# Patient Record
Sex: FEMALE | Race: WHITE | NOT HISPANIC OR LATINO | URBAN - METROPOLITAN AREA
[De-identification: names, ages, dates, MRNs, and addresses within clinical notes are randomized per-mention and may not be internally consistent; named-entity substitution may affect disease eponyms.]

---

## 2019-07-18 ENCOUNTER — HOSPITAL ENCOUNTER (EMERGENCY)
Facility: HOSPITAL | Age: 19
Discharge: HOME | End: 2019-07-19
Attending: EMERGENCY MEDICINE

## 2019-07-18 VITALS
WEIGHT: 230 LBS | TEMPERATURE: 98 F | HEART RATE: 98 BPM | OXYGEN SATURATION: 98 % | RESPIRATION RATE: 11 BRPM | SYSTOLIC BLOOD PRESSURE: 130 MMHG | DIASTOLIC BLOOD PRESSURE: 71 MMHG

## 2019-07-18 DIAGNOSIS — N10 ACUTE PYELONEPHRITIS: Primary | ICD-10-CM

## 2019-07-18 LAB
ANION GAP SERPL CALC-SCNC: 9 MMOL/L (ref 8–16)
B-HCG UR QL: NEGATIVE
BACTERIA #/AREA URNS HPF: ABNORMAL /HPF
BASOPHILS # BLD AUTO: 0.04 K/UL (ref 0–0.2)
BASOPHILS NFR BLD: 0.5 % (ref 0–1.9)
BILIRUB UR QL STRIP: NEGATIVE
BUN SERPL-MCNC: 9 MG/DL (ref 6–20)
CALCIUM SERPL-MCNC: 8.6 MG/DL (ref 8.7–10.5)
CHLORIDE SERPL-SCNC: 104 MMOL/L (ref 95–110)
CLARITY UR: ABNORMAL
CO2 SERPL-SCNC: 23 MMOL/L (ref 23–29)
COLOR UR: ABNORMAL
CREAT SERPL-MCNC: 1.1 MG/DL (ref 0.5–1.4)
DIFFERENTIAL METHOD: NORMAL
EOSINOPHIL # BLD AUTO: 0.1 K/UL (ref 0–0.5)
EOSINOPHIL NFR BLD: 0.7 % (ref 0–8)
ERYTHROCYTE [DISTWIDTH] IN BLOOD BY AUTOMATED COUNT: 12.5 % (ref 11.5–14.5)
EST. GFR  (AFRICAN AMERICAN): >60 ML/MIN/1.73 M^2
EST. GFR  (NON AFRICAN AMERICAN): >60 ML/MIN/1.73 M^2
GLUCOSE SERPL-MCNC: 96 MG/DL (ref 70–110)
GLUCOSE UR QL STRIP: ABNORMAL
HCT VFR BLD AUTO: 38.4 % (ref 37–48.5)
HGB BLD-MCNC: 13.1 G/DL (ref 12–16)
HGB UR QL STRIP: ABNORMAL
HYALINE CASTS #/AREA URNS LPF: 0 /LPF
IMM GRANULOCYTES # BLD AUTO: 0.02 K/UL (ref 0–0.04)
IMM GRANULOCYTES NFR BLD AUTO: 0.2 % (ref 0–0.5)
KETONES UR QL STRIP: ABNORMAL
LEUKOCYTE ESTERASE UR QL STRIP: ABNORMAL
LYMPHOCYTES # BLD AUTO: 1.6 K/UL (ref 1–4.8)
LYMPHOCYTES NFR BLD: 19.8 % (ref 18–48)
MCH RBC QN AUTO: 29.8 PG (ref 27–31)
MCHC RBC AUTO-ENTMCNC: 34.1 G/DL (ref 32–36)
MCV RBC AUTO: 87 FL (ref 82–98)
MICROSCOPIC COMMENT: ABNORMAL
MONOCYTES # BLD AUTO: 0.7 K/UL (ref 0.3–1)
MONOCYTES NFR BLD: 8.8 % (ref 4–15)
NEUTROPHILS # BLD AUTO: 5.6 K/UL (ref 1.8–7.7)
NEUTROPHILS NFR BLD: 70 % (ref 38–73)
NITRITE UR QL STRIP: POSITIVE
NRBC BLD-RTO: 0 /100 WBC
PH UR STRIP: 6 [PH] (ref 5–8)
PLATELET # BLD AUTO: 271 K/UL (ref 150–350)
PMV BLD AUTO: 11 FL (ref 9.2–12.9)
POTASSIUM SERPL-SCNC: 4 MMOL/L (ref 3.5–5.1)
PROT UR QL STRIP: ABNORMAL
RBC # BLD AUTO: 4.4 M/UL (ref 4–5.4)
RBC #/AREA URNS HPF: 5 /HPF (ref 0–4)
SODIUM SERPL-SCNC: 136 MMOL/L (ref 136–145)
SP GR UR STRIP: 1.01 (ref 1–1.03)
SQUAMOUS #/AREA URNS HPF: 15 /HPF
URN SPEC COLLECT METH UR: ABNORMAL
UROBILINOGEN UR STRIP-ACNC: 1 EU/DL
WBC # BLD AUTO: 8.03 K/UL (ref 3.9–12.7)
WBC #/AREA URNS HPF: 20 /HPF (ref 0–5)

## 2019-07-18 PROCEDURE — 85025 COMPLETE CBC W/AUTO DIFF WBC: CPT

## 2019-07-18 PROCEDURE — 87086 URINE CULTURE/COLONY COUNT: CPT

## 2019-07-18 PROCEDURE — 81025 URINE PREGNANCY TEST: CPT

## 2019-07-18 PROCEDURE — 99284 EMERGENCY DEPT VISIT MOD MDM: CPT | Mod: 25

## 2019-07-18 PROCEDURE — 96374 THER/PROPH/DIAG INJ IV PUSH: CPT

## 2019-07-18 PROCEDURE — 63600175 PHARM REV CODE 636 W HCPCS: Performed by: EMERGENCY MEDICINE

## 2019-07-18 PROCEDURE — 81000 URINALYSIS NONAUTO W/SCOPE: CPT

## 2019-07-18 PROCEDURE — 80048 BASIC METABOLIC PNL TOTAL CA: CPT

## 2019-07-18 RX ORDER — LEVOTHYROXINE SODIUM 125 UG/1
TABLET ORAL
COMMUNITY
Start: 2015-11-16

## 2019-07-18 RX ORDER — METHIMAZOLE 10 MG/1
5 TABLET ORAL 2 TIMES DAILY
COMMUNITY

## 2019-07-18 RX ADMIN — CEFTRIAXONE 1 G: 1 INJECTION, SOLUTION INTRAVENOUS at 11:07

## 2019-07-19 RX ORDER — CIPROFLOXACIN 500 MG/1
500 TABLET ORAL 2 TIMES DAILY
Qty: 20 TABLET | Refills: 0 | Status: SHIPPED | OUTPATIENT
Start: 2019-07-19 | End: 2019-07-29

## 2019-07-19 RX ORDER — ONDANSETRON 4 MG/1
4 TABLET, ORALLY DISINTEGRATING ORAL EVERY 6 HOURS PRN
Qty: 24 TABLET | Refills: 0 | Status: SHIPPED | OUTPATIENT
Start: 2019-07-19

## 2019-07-20 LAB — BACTERIA UR CULT: NO GROWTH

## 2019-07-23 ENCOUNTER — TELEPHONE (OUTPATIENT)
Dept: EMERGENCY MEDICINE | Facility: HOSPITAL | Age: 19
End: 2019-07-23

## 2019-07-24 NOTE — ED PROVIDER NOTES
Encounter Date: 7/18/2019       History     Chief Complaint   Patient presents with    Fever    Abdominal Pain     LEFT sided abd pain    Nausea     18-year-old female complains of left-sided flank and abdominal pain gradual in onset the past days with associated fever and nausea    She reports mild urinary frequency and minimal potential dysuria    She reports prior kidney stone albeit remote    As opposed to a kidney stone onset this has been gradual rather than abrupt in onset  The character of the pain is dull as opposed sharp and stabbing as well              Review of patient's allergies indicates:   Allergen Reactions    Sulfa (sulfonamide antibiotics)      Past Medical History:   Diagnosis Date    Tachycardia     Thyroid disease      History reviewed. No pertinent surgical history.  History reviewed. No pertinent family history.  Social History     Tobacco Use    Smoking status: Never Smoker   Substance Use Topics    Alcohol use: Never     Frequency: Never    Drug use: Never     Review of Systems   Constitutional: Positive for fever.   Respiratory: Negative.    Cardiovascular: Negative.    Gastrointestinal: Positive for abdominal pain (left upper / mid abd ) and nausea. Negative for abdominal distention, anal bleeding, constipation, diarrhea, rectal pain and vomiting.   Genitourinary: Positive for dysuria and flank pain.   Skin: Negative.    Hematological: Negative.    All other systems reviewed and are negative.      Physical Exam     Initial Vitals [07/18/19 2035]   BP Pulse Resp Temp SpO2   130/71 (!) 145 16 97.9 °F (36.6 °C) 97 %      MAP       --         Physical Exam    Nursing note and vitals reviewed.  Constitutional: She appears well-developed and well-nourished. She is not diaphoretic. No distress.   HENT:   Head: Normocephalic and atraumatic.   Nose: Nose normal.   Mouth/Throat: Oropharynx is clear and moist. No oropharyngeal exudate.   Eyes: Conjunctivae and EOM are normal. Pupils are  equal, round, and reactive to light. Right eye exhibits no discharge. Left eye exhibits no discharge. No scleral icterus.   Neck: Normal range of motion. Neck supple.   Cardiovascular: Normal rate, regular rhythm, normal heart sounds and intact distal pulses. Exam reveals no gallop and no friction rub.    No murmur heard.  Pulmonary/Chest: Breath sounds normal. No respiratory distress. She has no wheezes. She has no rhonchi. She has no rales.   Abdominal: Soft. Bowel sounds are normal. She exhibits no distension and no mass. There is tenderness (left flank pain to percussion ). There is no rebound and no guarding.   Musculoskeletal: Normal range of motion. She exhibits no edema or tenderness.   Lymphadenopathy:     She has no cervical adenopathy.   Neurological: She is alert and oriented to person, place, and time. She has normal strength. No cranial nerve deficit or sensory deficit.   Skin: Skin is warm and dry. Capillary refill takes less than 2 seconds. No rash noted. No erythema. No pallor.   Psychiatric: She has a normal mood and affect. Her behavior is normal. Judgment and thought content normal.         ED Course   Procedures  Labs Reviewed   BASIC METABOLIC PANEL - Abnormal; Notable for the following components:       Result Value    Calcium 8.6 (*)     All other components within normal limits    Narrative:     Recoll. [id number] by [facility] [place] at 07/18/2019 22:39, reason: Specimen   hemolyzed. Spoke to Michael Raman RN ED for recollect.    07/18/2019  22:39 [facility] [place]   URINALYSIS, REFLEX TO URINE CULTURE - Abnormal; Notable for the following components:    Appearance, UA Hazy (*)     Protein, UA 1+ (*)     Glucose, UA Trace (*)     Ketones, UA Trace (*)     Occult Blood UA Trace (*)     Nitrite, UA Positive (*)     Leukocytes, UA 1+ (*)     All other components within normal limits    Narrative:     Preferred Collection Type->Urine, Clean Catch   URINALYSIS MICROSCOPIC - Abnormal; Notable for the following components:     RBC, UA 5 (*)     WBC, UA 20 (*)     All other components within normal limits    Narrative:     Preferred Collection Type->Urine, Clean Catch   CULTURE, URINE    Narrative:     Preferred Collection Type->Urine, Clean Catch   CBC W/ AUTO DIFFERENTIAL   PREGNANCY TEST, URINE RAPID         Admission on 07/18/2019, Discharged on 07/19/2019   Component Date Value Ref Range Status    Sodium 07/18/2019 136  136 - 145 mmol/L Final    Potassium 07/18/2019 4.0  3.5 - 5.1 mmol/L Final    Chloride 07/18/2019 104  95 - 110 mmol/L Final    CO2 07/18/2019 23  23 - 29 mmol/L Final    Glucose 07/18/2019 96  70 - 110 mg/dL Final    BUN, Bld 07/18/2019 9  6 - 20 mg/dL Final    Creatinine 07/18/2019 1.1  0.5 - 1.4 mg/dL Final    Calcium 07/18/2019 8.6* 8.7 - 10.5 mg/dL Final    Anion Gap 07/18/2019 9  8 - 16 mmol/L Final    eGFR if African American 07/18/2019 >60.0  >60 mL/min/1.73 m^2 Final    eGFR if non African American 07/18/2019 >60.0  >60 mL/min/1.73 m^2 Final    Comment: Calculation used to obtain the estimated glomerular filtration  rate (eGFR) is the CKD-EPI equation.       WBC 07/18/2019 8.03  3.90 - 12.70 K/uL Final    RBC 07/18/2019 4.40  4.00 - 5.40 M/uL Final    Hemoglobin 07/18/2019 13.1  12.0 - 16.0 g/dL Final    Hematocrit 07/18/2019 38.4  37.0 - 48.5 % Final    Mean Corpuscular Volume 07/18/2019 87  82 - 98 fL Final    Mean Corpuscular Hemoglobin 07/18/2019 29.8  27.0 - 31.0 pg Final    Mean Corpuscular Hemoglobin Conc 07/18/2019 34.1  32.0 - 36.0 g/dL Final    RDW 07/18/2019 12.5  11.5 - 14.5 % Final    Platelets 07/18/2019 271  150 - 350 K/uL Final    MPV 07/18/2019 11.0  9.2 - 12.9 fL Final    Immature Granulocytes 07/18/2019 0.2  0.0 - 0.5 % Final    Gran # (ANC) 07/18/2019 5.6  1.8 - 7.7 K/uL Final    Immature Grans (Abs) 07/18/2019 0.02  0.00 - 0.04 K/uL Final    Comment: Mild elevation in immature granulocytes is non specific and   can be seen in a variety of conditions including  stress response,   acute inflammation, trauma and pregnancy. Correlation with other   laboratory and clinical findings is essential.      Lymph # 07/18/2019 1.6  1.0 - 4.8 K/uL Final    Mono # 07/18/2019 0.7  0.3 - 1.0 K/uL Final    Eos # 07/18/2019 0.1  0.0 - 0.5 K/uL Final    Baso # 07/18/2019 0.04  0.00 - 0.20 K/uL Final    nRBC 07/18/2019 0  0 /100 WBC Final    Gran% 07/18/2019 70.0  38.0 - 73.0 % Final    Lymph% 07/18/2019 19.8  18.0 - 48.0 % Final    Mono% 07/18/2019 8.8  4.0 - 15.0 % Final    Eosinophil% 07/18/2019 0.7  0.0 - 8.0 % Final    Basophil% 07/18/2019 0.5  0.0 - 1.9 % Final    Differential Method 07/18/2019 Automated   Final    Specimen UA 07/18/2019 Urine, Clean Catch   Final    Color, UA 07/18/2019 Sharifa  Yellow, Straw, Sharifa Final    Appearance, UA 07/18/2019 Hazy* Clear Final    pH, UA 07/18/2019 6.0  5.0 - 8.0 Final    Specific Gravity, UA 07/18/2019 1.010  1.005 - 1.030 Final    Protein, UA 07/18/2019 1+* Negative Final    Comment: Recommend a 24 hour urine protein or a urine   protein/creatinine ratio if globulin induced proteinuria is  clinically suspected.      Glucose, UA 07/18/2019 Trace* Negative Final    Ketones, UA 07/18/2019 Trace* Negative Final    Bilirubin (UA) 07/18/2019 Negative  Negative Final    Occult Blood UA 07/18/2019 Trace* Negative Final    Nitrite, UA 07/18/2019 Positive* Negative Final    Urobilinogen, UA 07/18/2019 1.0  Negative EU/dL Final    Leukocytes, UA 07/18/2019 1+* Negative Final    Preg Test, Ur 07/18/2019 Negative   Final    RBC, UA 07/18/2019 5* 0 - 4 /hpf Final    WBC, UA 07/18/2019 20* 0 - 5 /hpf Final    Bacteria 07/18/2019 Occasional  None-Occ /hpf Final    Squam Epithel, UA 07/18/2019 15  /hpf Final    Hyaline Casts, UA 07/18/2019 0  0-1/lpf /lpf Final    Microscopic Comment 07/18/2019 SEE COMMENT   Final    Comment: Other formed elements not mentioned in the report are not   present in the microscopic examination.        Urine Culture, Routine 07/18/2019 No growth   Final         Imaging Results    None          Medical Decision Making:   Clinical Tests:   Lab Tests: Ordered and Reviewed  ED Management:  Most consistent with acute pyelonephritis  Less likely would be ureterolithiasis  She understands return should she not observed expected improvement        Rocephin 2 g IV  Begin Cipro on Saturday morning 1 twice daily for 10 days  Zofran as needed for nausea  Good fluid intake  Expect stepwise improvement begin in next 2 days  Should you see the pain worsen or become intolerant of the oral medications and return for imaging as discussed                          Clinical Impression:       ICD-10-CM ICD-9-CM   1. Acute pyelonephritis N10 590.10         Disposition:   Disposition: Discharged  Condition: Stable                        Meng Jerry MD  07/23/19 2000

## 2019-07-24 NOTE — TELEPHONE ENCOUNTER
Pt placed call to ed with concerns about worsening pain despite ibuprofen, tylenol and abx therapy.   Discussed concerns with Dr. Jerry.  Pt advised to return to ED with continued complaints as needed. All questions asked were answered.